# Patient Record
Sex: MALE | Race: WHITE | Employment: FULL TIME | URBAN - METROPOLITAN AREA
[De-identification: names, ages, dates, MRNs, and addresses within clinical notes are randomized per-mention and may not be internally consistent; named-entity substitution may affect disease eponyms.]

---

## 2021-12-01 DIAGNOSIS — Z13.6 SCREENING FOR HEART DISEASE: Primary | ICD-10-CM

## 2021-12-06 ENCOUNTER — HOSPITAL ENCOUNTER (OUTPATIENT)
Dept: CARDIOLOGY | Facility: CLINIC | Age: 36
Discharge: HOME | End: 2021-12-06
Attending: INTERNAL MEDICINE | Admitting: INTERNAL MEDICINE

## 2021-12-06 DIAGNOSIS — Z13.6 SCREENING FOR HEART DISEASE: ICD-10-CM

## 2021-12-06 PROCEDURE — 75571 CT HRT W/O DYE W/CA TEST: CPT | Mod: GA

## 2021-12-06 PROCEDURE — 75571 CT HRT W/O DYE W/CA TEST: CPT | Mod: 26 | Performed by: INTERNAL MEDICINE

## 2024-05-31 ENCOUNTER — HOSPITAL ENCOUNTER (EMERGENCY)
Facility: CLINIC | Age: 39
Discharge: HOME | End: 2024-05-31
Attending: EMERGENCY MEDICINE | Admitting: EMERGENCY MEDICINE
Payer: COMMERCIAL

## 2024-05-31 VITALS
HEART RATE: 67 BPM | SYSTOLIC BLOOD PRESSURE: 134 MMHG | TEMPERATURE: 98.4 F | RESPIRATION RATE: 18 BRPM | HEIGHT: 71 IN | BODY MASS INDEX: 30.22 KG/M2 | DIASTOLIC BLOOD PRESSURE: 97 MMHG | OXYGEN SATURATION: 100 % | WEIGHT: 215.83 LBS

## 2024-05-31 DIAGNOSIS — S60.459A WOOD SPLINTER IN FINGER: ICD-10-CM

## 2024-05-31 PROCEDURE — 10120 INC&RMVL FB SUBQ TISS SMPL: CPT

## 2024-05-31 PROCEDURE — 99283 EMERGENCY DEPT VISIT LOW MDM: CPT | Mod: 25

## 2024-05-31 RX ORDER — CEPHALEXIN 500 MG/1
500 CAPSULE ORAL 4 TIMES DAILY
Qty: 20 CAPSULE | Refills: 0 | Status: SHIPPED | OUTPATIENT
Start: 2024-05-31 | End: 2024-06-05

## 2024-05-31 RX ORDER — BUPIVACAINE HYDROCHLORIDE 5 MG/ML
5 INJECTION, SOLUTION EPIDURAL; INTRACAUDAL ONCE
Status: DISCONTINUED | OUTPATIENT
Start: 2024-05-31 | End: 2024-05-31 | Stop reason: HOSPADM

## 2024-05-31 ASSESSMENT — ACTIVITIES OF DAILY LIVING (ADL): ADLS_ACUITY_SCORE: 33

## 2024-05-31 ASSESSMENT — COLUMBIA-SUICIDE SEVERITY RATING SCALE - C-SSRS
2. HAVE YOU ACTUALLY HAD ANY THOUGHTS OF KILLING YOURSELF IN THE PAST MONTH?: NO
1. IN THE PAST MONTH, HAVE YOU WISHED YOU WERE DEAD OR WISHED YOU COULD GO TO SLEEP AND NOT WAKE UP?: NO
6. HAVE YOU EVER DONE ANYTHING, STARTED TO DO ANYTHING, OR PREPARED TO DO ANYTHING TO END YOUR LIFE?: NO

## 2024-05-31 NOTE — ED TRIAGE NOTES
"PT arrives ambulatory by self for left thumb splinter. Was working with table saw/wood when it \"kicked back\" and large splinter impaled thumb. Splinter sticking out of left thumb about 1 cm. Pt attempted to removed it but was too much pressure and did not want to cause more damage.         "

## 2024-05-31 NOTE — ED PROVIDER NOTES
"  Emergency Department Note      History of Present Illness     Chief Complaint  Hand Injury    HPI  Isaac Singh is a right-handed 38 year old male who presents for evaluation of a hand injury. Isaac reports he was working with a table saw when it kicked back and a wood splinter impacted his medial left thumb. There is some of the splinter sticking out of the thumb. Patient's tetanus was last updated in 2018.    Independent Historian  None    Review of External Notes  Per [facility], patient's last tetanus was 10/12/2018.    Past Medical History   Medical History and Problem List  History reviewed.  No pertinent past medical history.     Medications  The patient is not currently taking any prescribed medications.     Surgical History  Inguinal hernia repair bilateral  Arthroscopy right knee  Physical Exam   Patient Vitals for the past 24 hrs:   BP Temp Temp src Pulse Resp SpO2 Height Weight   05/31/24 1808 (!) 134/97 -- -- 67 18 100 % -- --   05/31/24 1708 (!) 149/99 98.4  F (36.9  C) Oral 73 18 100 % 1.803 m (5' 11\") 97.9 kg (215 lb 13.3 oz)     Physical Exam  Constitutional: Alert, attentive, GCS 15   Eyes: EOM are normal, anicteric, conjugate gaze  CV: distal extremities warm, well perfused  Chest: Non-labored breathing on RA  MSK: Large splinter penetrating obliquely through distal phalanx of the left thumb volar aspect. Distal sensation intact. Capillary refill intact. Flexion extension normal.  Neurological: Alert, attentive, moving all extremities equally.   Skin: Skin is warm and dry.   Diagnostics   Lab Results   None    Imaging  None    Independent Interpretation  None  ED Course    Medications Administered  Medications   BUPivacaine (MARCAINE) 0.5% preservative free injection (has no administration in time range)       Procedures  Procedures      Foreign Body Removal     Procedure: Foreign Body Removal    Consent: Verbal    Risks Discussed: pain, bleeding, damage to adjacent structures, incomplete removal, " infection, and repeat attempts    Indication: Foreign body     Location: Subcutaneous left thumb    Preparation: Betadine    Anesthesia/Sedation: Bupivacaine - 0.5%    Procedure Detail:   Technique instruments: 2 small incisions, <1mm on each side of the splinter at the radial, more proximal exit wound. Extraction with hemostat. Prior to this, I removed the proximal wood fragments with toothed forceps. No clear retained FB left behind. Wound copiously irrigated at the sink after removal.  Description: The foreign body was located and removed.     Patient Status: The patient tolerated the procedure well: Yes. There were no complications.     Discussion of Management  None    Social Determinants of Health adding to complexity of care  None    ED Course  ED Course as of 05/31/24 1814   Fri May 31, 2024   1728 I obtained history and examined the patient as noted above.    1754 I rechecked the patient and explained findings. I performed a foreign body removal as outlined above.   1802 We discussed plans for discharge and the patient is comfortable with this plan.      Medical Decision Making / Diagnosis   CMS Diagnoses: None    MIPS     None    [facility]  Isaac Singh is a 38 year old male who presents for evaluation of a foreign body in the left thumb.  This was successfully removed, see above procedure note. No signs of complications of the foreign body including abscess, cellulitis, necrotizing fascitis, penetration of vascular or nerve structures, etc.  Patient is more comfortable after removal. 9 I will send home with Keflex for infection prophylaxis given full penetration through the distal tuft, return precautions were reviewed I also gave them the phone number for hand surgery should any mild complications arise.    Disposition  The patient was discharged.     ICD-10 Codes:    ICD-10-CM    1. Wood splinter in finger  S60.459A            Discharge Medications  Discharge Medication List as of 5/31/2024  6:07 PM         START taking these medications    Details   cephALEXin (KEFLEX) 500 MG capsule Take 1 capsule (500 mg) by mouth 4 times daily for 5 days, Disp-20 capsule, R-0, Local Print             Abran Encinas MD  [employer]  6:16 PM 05/31/24     Scribe Disclosure:  I, Dawna Germanstevo, am serving as a scribe at 5:34 PM on 5/31/2024 to document services personally performed by Abran Encinas MD based on my observations and the provider's statements to me.        Abran Encinas MD  05/31/24 7312

## 2024-05-31 NOTE — DISCHARGE INSTRUCTIONS
You should take the antibiotics as prescribed, it is okay to take Tylenol and ibuprofen as needed for mild pain.  You develop severe pain, redness, significant swelling of the thumb or redness streaking up the hand, you should return to the emergency department but I would also call hand surgery to see if he can be seen that same day, if you are able to be seen by them, you can go to the hand clinic otherwise I would come back here.

## 2025-02-11 ENCOUNTER — HOSPITAL ENCOUNTER (EMERGENCY)
Facility: CLINIC | Age: 40
Discharge: HOME | End: 2025-02-11
Attending: STUDENT IN AN ORGANIZED HEALTH CARE EDUCATION/TRAINING PROGRAM | Admitting: STUDENT IN AN ORGANIZED HEALTH CARE EDUCATION/TRAINING PROGRAM
Payer: COMMERCIAL

## 2025-02-11 VITALS
RESPIRATION RATE: 18 BRPM | WEIGHT: 221.34 LBS | BODY MASS INDEX: 30.99 KG/M2 | OXYGEN SATURATION: 99 % | DIASTOLIC BLOOD PRESSURE: 94 MMHG | SYSTOLIC BLOOD PRESSURE: 145 MMHG | HEIGHT: 71 IN | HEART RATE: 74 BPM | TEMPERATURE: 98.8 F

## 2025-02-11 DIAGNOSIS — R03.0 ELEVATED BLOOD PRESSURE READING: ICD-10-CM

## 2025-02-11 DIAGNOSIS — B34.9 VIRAL SYNDROME: ICD-10-CM

## 2025-02-11 LAB
FLUAV RNA SPEC QL NAA+PROBE: NEGATIVE
FLUBV RNA RESP QL NAA+PROBE: NEGATIVE
RSV RNA SPEC NAA+PROBE: NEGATIVE
S PYO DNA THROAT QL NAA+PROBE: NOT DETECTED
SARS-COV-2 RNA RESP QL NAA+PROBE: NEGATIVE

## 2025-02-11 PROCEDURE — 87637 SARSCOV2&INF A&B&RSV AMP PRB: CPT | Performed by: STUDENT IN AN ORGANIZED HEALTH CARE EDUCATION/TRAINING PROGRAM

## 2025-02-11 PROCEDURE — 99283 EMERGENCY DEPT VISIT LOW MDM: CPT

## 2025-02-11 PROCEDURE — 87651 STREP A DNA AMP PROBE: CPT | Performed by: STUDENT IN AN ORGANIZED HEALTH CARE EDUCATION/TRAINING PROGRAM

## 2025-02-11 RX ORDER — AMLODIPINE BESYLATE 5 MG/1
5 TABLET ORAL DAILY
Qty: 30 TABLET | Refills: 0 | Status: SHIPPED | OUTPATIENT
Start: 2025-02-11

## 2025-02-11 ASSESSMENT — ACTIVITIES OF DAILY LIVING (ADL): ADLS_ACUITY_SCORE: 41

## 2025-02-11 NOTE — ED TRIAGE NOTES
Pt. Presents to ED for feeling generally under the weather for the last couple days. Reports more post nasal drip starting yesterday. Woke up around 0230, took mucinex and a nasal decongestant. Pt. Reports diarrhea starting around 0630. Pt. Reports his BP has been higher than usual, does not take meds for BP. Pt. Denies fevers. Reports some sore throat and cough. Reports nausea but denies vomiting. Took zofran at 1330 and reports some relief. Pt. Reports frequent abdominal cramping, specifically in his LUQ. Denies difficulty urinating. Able to keep fluids down. Reports there is upper respiratory symptoms going around the house and their oldest child was diagnosed with strep today. Reports some dizziness.   Took tylenol and ibuprofen around 0500. Took baby ASA PTA.

## 2025-02-11 NOTE — ED PROVIDER NOTES
"  Emergency Department Note      History of Present Illness     Chief Complaint   Nausea, Diarrhea, and Cough      HPI   Isaac Singh is a 39 year old male who presents for an evaluation of hypertension.  Over last couple days, patient has been feeling under the weather.  He has had postnasal drip, sore throat, cough, nonbloody diarrhea.  He has tried Mucinex and nasal decongestants.  Patient has been feeling a little lightheaded.  No chest pain or shortness of breath.  No abdominal pain.  No vomiting.  Drank 2, 16 ounce serrano a day.  Had a blood pressure at home of 160 which made him very concerned and prompted the visit.  Son at home tested positive for strep.  Independent Historian   None    Review of External Notes   None    Past Medical History     Medical History and Problem List   Repetitive Strain Injury of thoracic spine    Medications   The patient is currently on no regular medications.       Surgical History   Right Knee Arthroscopy  Laparoscopic BIH  Inguinal Hernia Repair Bilateral  Physical Exam     Patient Vitals for the past 24 hrs:   BP Temp Temp src Pulse Resp SpO2 Height Weight   02/11/25 1841 (!) 145/94 -- -- 74 18 99 % -- --   02/11/25 1709 (!) 150/104 98.8  F (37.1  C) Oral 77 18 99 % 1.803 m (5' 11\") 100.4 kg (221 lb 5.5 oz)     Physical Exam  GENERAL: Patient well-appearing  HEAD: Atraumatic.  NECK: No rigidity  EYE: PERRL  CV: RRR, no murmurs, rubs or gallops  PULM: CTAB with good aeration; no retractions, rales, rhonchi, or wheezing  ABD: Soft, nontender, nondistended, no guarding  DERM: No rash. Skin warm and dry  EXTREMITY: Moving all extremities without difficulty. Ambulates without issue.    Diagnostics     Lab Results   Labs Ordered and Resulted from Time of ED Arrival to Time of ED Departure   INFLUENZA A/B, RSV AND SARS-COV2 PCR - Normal       Result Value    Influenza A PCR Negative      Influenza B PCR Negative      RSV PCR Negative      SARS CoV2 PCR Negative     GROUP A " STREPTOCOCCUS PCR THROAT SWAB - Normal    Group A strep by PCR Not Detected         Imaging   No orders to display         ED Course      Medications Administered   Medications - No data to display    Procedures   Procedures     Discussion of Management   None    ED Course   ED Course as of 02/11/25 2229 Tue Feb 11, 2025   1730 I obtained the history and evaluated the patient as noted above.        Additional Documentation  None    Medical Decision Making / Diagnosis     CMS Diagnoses: None    MIPS       None    MDM   Isaac Singh is a 39 year old male     Presents with concerns of elevated blood pressure at home.  Typically he runs in the 130s, but today had a reading of 160 which made him concerned.  He has no chest pain or shortness of breath.  He is well-appearing.  Blood pressure 150/104 on initial assessment here and repeat is improved.  I do not see any indication to get labs, EKG, or further emergent workup.  He did have a strep swab and viral swabs at triage which were negative.  I had multiple extensive discussions with the patient and his spouse that I suspect his blood pressure is elevated because of his current cold.  He has also been taking decongestants which can raise the blood pressure.  I recommend he continue to monitor the blood pressure at home and if it is persistently elevated, then blood pressure medication could be started.  I counseled on diet and exercise and their effectiveness and decreasing blood pressure as well as increasing his hydration.  However, patient prefers to be started on medication.    Prescribed 5 mg p.o. amlodipine.  No signs of hypertensive emergency.  I have evaluated the patient for acute medical emergencies and have clinically decided no further acute medical interventions are required. Reassessed multiple times and well appearing. Patient stable for discharge. All questions answered. Given strict return precautions. Patient content with plan. The differential  diagnosis and treatment modalities were discussed thoroughly with the patient. Recommended PCP follow-up in 2-3 days.    Disposition   The patient was discharged.     Diagnosis     ICD-10-CM    1. Viral syndrome  B34.9       2. Elevated blood pressure reading  R03.0            Discharge Medications   Discharge Medication List as of 2/11/2025  6:38 PM        START taking these medications    Details   amLODIPine (NORVASC) 5 MG tablet Take 1 tablet (5 mg) by mouth daily., Disp-30 tablet, R-0, E-Prescribe               Scribe Disclosure:  IJovi, am serving as a scribe at 5:30 PM on 2/11/2025 to document services personally performed by Raymundo Sifuentes MD based on my observations and the provider's statements to me.       Raymundo Sifuentes MD  02/11/25 3014

## 2025-02-12 NOTE — DISCHARGE INSTRUCTIONS
Return to the emergency department if symptoms are worsening, become concerning, or for any other concerns. Follow-up with your doctor in 2-3 and sooner if needed.    Discharge Instructions  Hypertension - High Blood Pressure    During you visit to the Emergency Department, your blood pressure was higher than the recommended blood pressure.  This may be related to stress, pain, medication or other temporary conditions. In these cases, your blood pressure may return to normal on its own. If you have a history of high blood pressure, you may need to have your provider adjust your medications. Sometimes, your high measurement here may indicate that you have developed high blood pressure that will stay high unless it is treated. As a general rule, high blood pressure causes problems over years rather than days, weeks, or months. So, while it is important to treat blood pressure, it is rarely important to treat blood pressure immediately. Occasionally we will begin a medication in the Emergency Department; more often we will recommend close follow-up for medications with a primary doctor/clinic.    Generally, every Emergency Department visit should have a follow-up clinic visit with either a primary or a specialty clinic/provider. Please follow-up as instructed by your emergency provider today.    Return to the Emergency Department if you start to have:  A severe headache.  Chest pain.  Shortness of breath.  Weakness or numbness that affects one part of the body.  Confusion.  Vision changes.  Significant swelling of legs and/or eyes.  A reaction to any medication started in the Emergency Department.    What can I do to help myself?  Avoid alcohol.  Take any blood pressure medicine that you are prescribed.  Get a good night s sleep.  Lower your salt intake.  Exercise.  Lose weight.  Manage stress.  See your doctor regularly    If blood pressure medication was started in the Emergency Department:  The medicine may not have  an immediate effect. The body and brain determine what blood pressure you have. The medicine s job is to retrain the body s  thermostat  to a lower blood pressure.  You will need to follow up with your provider to see how this medicine is working for you.  If you were given a prescription for medicine here today, be sure to read all of the information (including the package insert) that comes with your prescription.  This will include important information about the medicine, its side effects, and any warnings that you need to know about.  The pharmacist who fills the prescription can provide more information and answer questions you may have about the medicine.  If you have questions or concerns that the pharmacist cannot address, please call or return to the Emergency Department.   Remember that you can always come back to the Emergency Department if you are not able to see your regular provider in the amount of time listed above, if you get any new symptoms, or if there is anything that worries you.

## (undated) RX ORDER — LIDOCAINE HYDROCHLORIDE 10 MG/ML
INJECTION, SOLUTION EPIDURAL; INFILTRATION; INTRACAUDAL; PERINEURAL
Status: DISPENSED
Start: 2024-05-31